# Patient Record
Sex: MALE | Race: WHITE | URBAN - METROPOLITAN AREA
[De-identification: names, ages, dates, MRNs, and addresses within clinical notes are randomized per-mention and may not be internally consistent; named-entity substitution may affect disease eponyms.]

---

## 2021-04-23 ENCOUNTER — HOSPITAL ENCOUNTER (OUTPATIENT)
Age: 41
Setting detail: SPECIMEN
Discharge: HOME | End: 2021-04-23

## 2021-04-23 LAB
HAV IGM SER IA-ACNC: NONREACTIVE
HEPATITIS B CORE IGM ANTIBODY: NONREACTIVE
HEPATITIS B SURFACE ANTIGEN: NONREACTIVE
HEPATITIS C ANTIBODY: NONREACTIVE
HIV AG/AB: NONREACTIVE
T. PALLIDUM, IGG: NONREACTIVE

## 2021-04-24 LAB
SOURCE: NORMAL
TRICHOMONAS VAGINALI, MOLECULAR: NEGATIVE

## 2021-04-26 LAB
C. TRACHOMATIS DNA ,URINE: NEGATIVE
N. GONORRHOEAE DNA, URINE: NEGATIVE
SPECIMEN DESCRIPTION: NORMAL

## 2021-04-27 LAB
HERPES SIMPLEX VIRUS 1 IGG: 0.69
HERPES SIMPLEX VIRUS 2 IGG: 3.72
HERPES TYPE 1/2 IGM COMBINED: 0.96

## 2021-08-30 ENCOUNTER — HOSPITAL ENCOUNTER (OUTPATIENT)
Age: 41
Setting detail: SPECIMEN
Discharge: HOME | End: 2021-08-30

## 2021-09-03 LAB — DERMATOLOGY PATHOLOGY REPORT: NORMAL

## 2024-03-12 ENCOUNTER — OFFICE VISIT (OUTPATIENT)
Age: 44
End: 2024-03-12

## 2024-03-12 DIAGNOSIS — R09.81 NASAL CONGESTION: ICD-10-CM

## 2024-03-12 DIAGNOSIS — F41.8 MIXED ANXIETY AND DEPRESSIVE DISORDER: Primary | ICD-10-CM

## 2024-03-12 RX ORDER — CITALOPRAM 20 MG/1
20 TABLET ORAL DAILY
Qty: 90 TABLET | Refills: 0 | Status: SHIPPED | OUTPATIENT
Start: 2024-03-12 | End: 2024-06-10

## 2024-03-12 RX ORDER — BUSPIRONE HYDROCHLORIDE 10 MG/1
10 TABLET ORAL 2 TIMES DAILY
Qty: 90 TABLET | Refills: 0 | Status: SHIPPED | OUTPATIENT
Start: 2024-03-12 | End: 2024-04-26

## 2024-03-12 RX ORDER — FLUTICASONE PROPIONATE 50 MCG
1 SPRAY, SUSPENSION (ML) NASAL DAILY
Qty: 16 G | Refills: 0 | Status: SHIPPED | OUTPATIENT
Start: 2024-03-12

## 2024-03-12 NOTE — PROGRESS NOTES
Michael Raya (:  1980) is a 43 y.o. male,New patient, here for evaluation of the following chief complaint(s):  Other (Dizziness, off balance, headache, not feeling well.)    ASSESSMENT/PLAN:  1. Mixed anxiety and depressive disorder  Assessment & Plan:   PHQ-9 Total Score: 19 (2024  3:46 PM)  Thoughts that you would be better off dead, or of hurting yourself in some way: 0 (2024  3:46 PM)        2024     3:47 PM   MCKAYLA 7 SCORE   MCKAYLA-7 Total Score 12   Denies any SI or HI attempt. Prescribed Celexa 20 mg daily, and Buspar 10 mg BID as needed for anxiety. Discussed about medication compliance and importance of follow up appointments. Discussed about relaxation techniques such as walking, listening music, yoga etc. He was given work excuse today to get rest. If he doesn't get better, develops new and/or worsening symptoms, he needs to contact with the clinic to be re-evaluated. He verbalized understanding and agrees above the plan.    Orders:  -     citalopram (CELEXA) 20 MG tablet; Take 1 tablet by mouth daily, Disp-90 tablet, R-0Print  -     busPIRone (BUSPAR) 10 MG tablet; Take 1 tablet by mouth 2 times daily, Disp-90 tablet, R-0Print  2. Nasal congestion  Assessment & Plan:   Prescribed Fluticasone nasal spray, 1 spray each nostril daily. Advised try to stay away allergens, may wear a mask while moving lawn or working at the yard work, or at the factory if it smells and causes rhinorrhea and nasal congestion. He may also take OTC allergy medication such as cetirizine or Loratadine.   Orders:  -     fluticasone (FLONASE) 50 MCG/ACT nasal spray; 1 spray by Each Nostril route daily, Disp-16 g, R-0Print    Return in about 1 month (around 2024), or if symptoms worsen or fail to improve.     Subjective   SUBJECTIVE/OBJECTIVE:  Michael Raya 43 y.o. male presented to the clinic as walk-in stating that he is not feeling well. He had really bad anxiety and panic attack one and half month

## 2024-04-02 VITALS
HEART RATE: 82 BPM | WEIGHT: 151 LBS | TEMPERATURE: 97.9 F | RESPIRATION RATE: 17 BRPM | BODY MASS INDEX: 19.38 KG/M2 | OXYGEN SATURATION: 98 % | SYSTOLIC BLOOD PRESSURE: 110 MMHG | HEIGHT: 74 IN | DIASTOLIC BLOOD PRESSURE: 80 MMHG

## 2024-04-02 PROBLEM — R09.81 NASAL CONGESTION: Status: ACTIVE | Noted: 2024-04-02

## 2024-04-02 PROBLEM — F41.8 MIXED ANXIETY AND DEPRESSIVE DISORDER: Status: ACTIVE | Noted: 2024-04-02

## 2024-04-02 ASSESSMENT — PATIENT HEALTH QUESTIONNAIRE - PHQ9
7. TROUBLE CONCENTRATING ON THINGS, SUCH AS READING THE NEWSPAPER OR WATCHING TELEVISION: MORE THAN HALF THE DAYS
4. FEELING TIRED OR HAVING LITTLE ENERGY: NEARLY EVERY DAY
2. FEELING DOWN, DEPRESSED OR HOPELESS: SEVERAL DAYS
9. THOUGHTS THAT YOU WOULD BE BETTER OFF DEAD, OR OF HURTING YOURSELF: NOT AT ALL
3. TROUBLE FALLING OR STAYING ASLEEP: NEARLY EVERY DAY
SUM OF ALL RESPONSES TO PHQ QUESTIONS 1-9: 19
SUM OF ALL RESPONSES TO PHQ QUESTIONS 1-9: 19
SUM OF ALL RESPONSES TO PHQ9 QUESTIONS 1 & 2: 3
1. LITTLE INTEREST OR PLEASURE IN DOING THINGS: MORE THAN HALF THE DAYS
10. IF YOU CHECKED OFF ANY PROBLEMS, HOW DIFFICULT HAVE THESE PROBLEMS MADE IT FOR YOU TO DO YOUR WORK, TAKE CARE OF THINGS AT HOME, OR GET ALONG WITH OTHER PEOPLE: VERY DIFFICULT
SUM OF ALL RESPONSES TO PHQ QUESTIONS 1-9: 19
6. FEELING BAD ABOUT YOURSELF - OR THAT YOU ARE A FAILURE OR HAVE LET YOURSELF OR YOUR FAMILY DOWN: NEARLY EVERY DAY
5. POOR APPETITE OR OVEREATING: NEARLY EVERY DAY
8. MOVING OR SPEAKING SO SLOWLY THAT OTHER PEOPLE COULD HAVE NOTICED. OR THE OPPOSITE, BEING SO FIGETY OR RESTLESS THAT YOU HAVE BEEN MOVING AROUND A LOT MORE THAN USUAL: MORE THAN HALF THE DAYS
SUM OF ALL RESPONSES TO PHQ QUESTIONS 1-9: 19

## 2024-04-02 ASSESSMENT — ANXIETY QUESTIONNAIRES
3. WORRYING TOO MUCH ABOUT DIFFERENT THINGS: MORE THAN HALF THE DAYS
GAD7 TOTAL SCORE: 12
IF YOU CHECKED OFF ANY PROBLEMS ON THIS QUESTIONNAIRE, HOW DIFFICULT HAVE THESE PROBLEMS MADE IT FOR YOU TO DO YOUR WORK, TAKE CARE OF THINGS AT HOME, OR GET ALONG WITH OTHER PEOPLE: SOMEWHAT DIFFICULT
7. FEELING AFRAID AS IF SOMETHING AWFUL MIGHT HAPPEN: MORE THAN HALF THE DAYS
5. BEING SO RESTLESS THAT IT IS HARD TO SIT STILL: NOT AT ALL
6. BECOMING EASILY ANNOYED OR IRRITABLE: MORE THAN HALF THE DAYS
2. NOT BEING ABLE TO STOP OR CONTROL WORRYING: MORE THAN HALF THE DAYS
4. TROUBLE RELAXING: MORE THAN HALF THE DAYS
1. FEELING NERVOUS, ANXIOUS, OR ON EDGE: MORE THAN HALF THE DAYS

## 2024-04-02 NOTE — ASSESSMENT & PLAN NOTE
PHQ-9 Total Score: 19 (4/2/2024  3:46 PM)  Thoughts that you would be better off dead, or of hurting yourself in some way: 0 (4/2/2024  3:46 PM)        4/2/2024     3:47 PM   MCKAYLA 7 SCORE   MCKAYLA-7 Total Score 12   Denies any SI or HI attempt. Prescribed Celexa 20 mg daily, and Buspar 10 mg BID as needed for anxiety. Discussed about medication compliance and importance of follow up appointments. Discussed about relaxation techniques such as walking, listening music, yoga etc. He was given work excuse today to get rest. If he doesn't get better, develops new and/or worsening symptoms, he needs to contact with the clinic to be re-evaluated. He verbalized understanding and agrees above the plan.

## 2024-04-02 NOTE — ASSESSMENT & PLAN NOTE
Prescribed Fluticasone nasal spray, 1 spray each nostril daily. Advised try to stay away allergens, may wear a mask while moving lawn or working at the yard work, or at the factory if it smells and causes rhinorrhea and nasal congestion. He may also take OTC allergy medication such as cetirizine or Loratadine.